# Patient Record
Sex: MALE | Race: ASIAN | Employment: FULL TIME | URBAN - METROPOLITAN AREA
[De-identification: names, ages, dates, MRNs, and addresses within clinical notes are randomized per-mention and may not be internally consistent; named-entity substitution may affect disease eponyms.]

---

## 2024-10-23 ENCOUNTER — HOSPITAL ENCOUNTER (EMERGENCY)
Age: 44
Discharge: HOME | End: 2024-10-24
Attending: EMERGENCY MEDICINE
Payer: COMMERCIAL

## 2024-10-23 ENCOUNTER — APPOINTMENT (OUTPATIENT)
Dept: GENERAL RADIOLOGY | Age: 44
End: 2024-10-23
Attending: EMERGENCY MEDICINE
Payer: COMMERCIAL

## 2024-10-23 DIAGNOSIS — R20.2 PARESTHESIAS: ICD-10-CM

## 2024-10-23 DIAGNOSIS — R00.2 PALPITATIONS: Primary | ICD-10-CM

## 2024-10-23 DIAGNOSIS — R07.89 CHEST PAIN, ATYPICAL: ICD-10-CM

## 2024-10-23 DIAGNOSIS — E87.6 HYPOKALEMIA: ICD-10-CM

## 2024-10-23 LAB
BASOPHILS # BLD AUTO: 0.06 X10(3) UL (ref 0–0.2)
BASOPHILS NFR BLD AUTO: 0.4 %
EOSINOPHIL # BLD AUTO: 0.09 X10(3) UL (ref 0–0.7)
EOSINOPHIL NFR BLD AUTO: 0.6 %
ERYTHROCYTE [DISTWIDTH] IN BLOOD BY AUTOMATED COUNT: 12.7 %
HCT VFR BLD AUTO: 39.3 %
HGB BLD-MCNC: 14 G/DL
IMM GRANULOCYTES # BLD AUTO: 0.08 X10(3) UL (ref 0–1)
IMM GRANULOCYTES NFR BLD: 0.5 %
LYMPHOCYTES # BLD AUTO: 1.67 X10(3) UL (ref 1–4)
LYMPHOCYTES NFR BLD AUTO: 10.5 %
MCH RBC QN AUTO: 31.8 PG (ref 26–34)
MCHC RBC AUTO-ENTMCNC: 35.6 G/DL (ref 31–37)
MCV RBC AUTO: 89.3 FL
MONOCYTES # BLD AUTO: 0.66 X10(3) UL (ref 0.1–1)
MONOCYTES NFR BLD AUTO: 4.1 %
NEUTROPHILS # BLD AUTO: 13.4 X10 (3) UL (ref 1.5–7.7)
NEUTROPHILS # BLD AUTO: 13.4 X10(3) UL (ref 1.5–7.7)
NEUTROPHILS NFR BLD AUTO: 83.9 %
PLATELET # BLD AUTO: 259 10(3)UL (ref 150–450)
RBC # BLD AUTO: 4.4 X10(6)UL
WBC # BLD AUTO: 16 X10(3) UL (ref 4–11)

## 2024-10-23 PROCEDURE — 85379 FIBRIN DEGRADATION QUANT: CPT | Performed by: EMERGENCY MEDICINE

## 2024-10-23 PROCEDURE — 99284 EMERGENCY DEPT VISIT MOD MDM: CPT

## 2024-10-23 PROCEDURE — 99285 EMERGENCY DEPT VISIT HI MDM: CPT

## 2024-10-23 PROCEDURE — 71045 X-RAY EXAM CHEST 1 VIEW: CPT | Performed by: EMERGENCY MEDICINE

## 2024-10-23 PROCEDURE — 93005 ELECTROCARDIOGRAM TRACING: CPT

## 2024-10-23 PROCEDURE — 93010 ELECTROCARDIOGRAM REPORT: CPT

## 2024-10-23 PROCEDURE — 84484 ASSAY OF TROPONIN QUANT: CPT | Performed by: EMERGENCY MEDICINE

## 2024-10-23 PROCEDURE — 36415 COLL VENOUS BLD VENIPUNCTURE: CPT

## 2024-10-23 PROCEDURE — 80053 COMPREHEN METABOLIC PANEL: CPT | Performed by: EMERGENCY MEDICINE

## 2024-10-23 PROCEDURE — 85025 COMPLETE CBC W/AUTO DIFF WBC: CPT | Performed by: EMERGENCY MEDICINE

## 2024-10-24 ENCOUNTER — APPOINTMENT (OUTPATIENT)
Dept: CT IMAGING | Age: 44
End: 2024-10-24
Attending: EMERGENCY MEDICINE
Payer: COMMERCIAL

## 2024-10-24 VITALS
WEIGHT: 198 LBS | HEART RATE: 66 BPM | DIASTOLIC BLOOD PRESSURE: 68 MMHG | HEIGHT: 72 IN | OXYGEN SATURATION: 99 % | BODY MASS INDEX: 26.82 KG/M2 | SYSTOLIC BLOOD PRESSURE: 103 MMHG | RESPIRATION RATE: 17 BRPM | TEMPERATURE: 98 F

## 2024-10-24 LAB
ALBUMIN SERPL-MCNC: 3.8 G/DL (ref 3.4–5)
ALBUMIN/GLOB SERPL: 1.2 {RATIO} (ref 1–2)
ALP LIVER SERPL-CCNC: 78 U/L
ALT SERPL-CCNC: 31 U/L
ANION GAP SERPL CALC-SCNC: 7 MMOL/L (ref 0–18)
AST SERPL-CCNC: 19 U/L (ref 15–37)
ATRIAL RATE: 92 BPM
BILIRUB SERPL-MCNC: 0.4 MG/DL (ref 0.1–2)
BUN BLD-MCNC: 20 MG/DL (ref 9–23)
CALCIUM BLD-MCNC: 9.3 MG/DL (ref 8.5–10.1)
CHLORIDE SERPL-SCNC: 104 MMOL/L (ref 98–112)
CO2 SERPL-SCNC: 25 MMOL/L (ref 21–32)
CREAT BLD-MCNC: 1.35 MG/DL
D DIMER PPP FEU-MCNC: <0.27 UG/ML FEU (ref ?–0.5)
EGFRCR SERPLBLD CKD-EPI 2021: 68 ML/MIN/1.73M2 (ref 60–?)
GLOBULIN PLAS-MCNC: 3.3 G/DL (ref 2.8–4.4)
GLUCOSE BLD-MCNC: 147 MG/DL (ref 70–99)
OSMOLALITY SERPL CALC.SUM OF ELEC: 287 MOSM/KG (ref 275–295)
P AXIS: 62 DEGREES
P-R INTERVAL: 170 MS
POTASSIUM SERPL-SCNC: 3.1 MMOL/L (ref 3.5–5.1)
POTASSIUM SERPL-SCNC: 3.8 MMOL/L (ref 3.5–5.1)
PROT SERPL-MCNC: 7.1 G/DL (ref 6.4–8.2)
Q-T INTERVAL: 374 MS
QRS DURATION: 92 MS
QTC CALCULATION (BEZET): 462 MS
R AXIS: 63 DEGREES
SODIUM SERPL-SCNC: 136 MMOL/L (ref 136–145)
T AXIS: 50 DEGREES
TROPONIN I SERPL HS-MCNC: 11 NG/L
VENTRICULAR RATE: 92 BPM

## 2024-10-24 PROCEDURE — 84132 ASSAY OF SERUM POTASSIUM: CPT | Performed by: EMERGENCY MEDICINE

## 2024-10-24 PROCEDURE — 70450 CT HEAD/BRAIN W/O DYE: CPT | Performed by: EMERGENCY MEDICINE

## 2024-10-24 RX ORDER — POTASSIUM CHLORIDE 1500 MG/1
20 TABLET, EXTENDED RELEASE ORAL DAILY
Qty: 14 TABLET | Refills: 0 | Status: SHIPPED | OUTPATIENT
Start: 2024-10-24 | End: 2024-11-07

## 2024-10-24 RX ORDER — POTASSIUM CHLORIDE 1500 MG/1
40 TABLET, EXTENDED RELEASE ORAL ONCE
Status: COMPLETED | OUTPATIENT
Start: 2024-10-24 | End: 2024-10-24

## 2024-10-24 NOTE — ED PROVIDER NOTES
Patient Seen in: [facility] [place]      History     Chief Complaint   Patient presents with    Arrythmia/Palpitations     Stated Complaint: palpatations    Subjective:   HPI      This is a 43-year-old gentleman here for evaluation of palpitations, left side of the body tingling/numbness.  Patient states he has had intermittent tingling and numbness on the left side of his body for many many years.  He states this evening he went to exercise, took his typical preworkout supplement which contains about 300 mg of caffeine.  Exercise doing a mix of weightlifting and cardio without difficulty.  Came home ate his dinner.  States later on that evening was sitting on the couch watching TV.  Suddenly felt strange, like his heart was racing, and felt left-sided tingling and numbness as he had before however symptoms were different today as the symptoms persisted for about an hour.  He also states with this left-sided tingling sensation that he occasionally gets a pinching sensation in his left side of the chest, also ongoing for many years.  Symptoms typically occur at rest when he gets this pinching sensation.  Denies any other complaints or concerns checked his heart rate on his Apple Watch and it was about 115.  Denies any chest pain any recent limitation to his exercise tolerance any family history of cardiac disease any leg swelling, fevers cough cold symptoms vomiting diarrhea any other concerns.  Objective:     Past Medical History:    Anxiety    Hyperlipidemia              No pertinent past surgical history.              Social History     Socioeconomic History    Marital status:    Tobacco Use    Smoking status: Never    Smokeless tobacco: Never   Vaping Use    Vaping status: Never Used   Substance and Sexual Activity    Alcohol use: Never    Drug use: Never     Social Drivers of Health     Financial Resource Strain: Low Risk  (8/2/2022)    Received from [facility]     Overall Financial Resource Strain (CARDIA)     Difficulty of Paying Living Expenses: Not hard at all   Food Insecurity: No Food Insecurity (8/2/2022)    Received from [facility]    Hunger Vital Sign     Worried About Running Out of Food in the Last Year: Never true     Ran Out of Food in the Last Year: Never true   Transportation Needs: No Transportation Needs (8/2/2022)    Received from [facility]    PRAPARE - Transportation     Lack of Transportation (Medical): No     Lack of Transportation (Non-Medical): No   Physical Activity: Sufficiently Active (8/2/2022)    Received from [facility]    Exercise Vital Sign     Days of Exercise per Week: 4 days     Minutes of Exercise per Session: 60 min   Stress: No Stress Concern Present (8/2/2022)    Received from [facility]    [employer] [place] [employer] - Occupational Stress Questionnaire     Feeling of Stress : Not at all   Social Connections: Moderately Integrated (8/2/2022)    Received from [facility]    Social Connection and Isolation Panel [NHANES]     Frequency of Communication with Friends and Family: More than three times a week     Frequency of Social Gatherings with Friends and Family: Twice a week     Attends Yazidi Services: More than 4 times per year     Active Member of Clubs or Organizations: No     Attends Club or Organization Meetings: Never     Marital Status:    Housing Stability: Low Risk  (8/2/2022)    Received from [facility]    Housing Stability Vital Sign     Unable to Pay for Housing in the Last Year: No     Number of Places Lived in the Last Year: 1     Unstable Housing in the Last Year: No                  Physical Exam     ED Triage Vitals [10/23/24 2309]   /71   Pulse 84   Resp 22   Temp 98.3 °F (36.8 °C)   Temp src Oral   SpO2 98 %   O2 Device None (Room air)       Current Vitals:   Vital Signs  BP: 103/68  Pulse: 66  Resp: 17  Temp:  98.3 °F (36.8 °C)  Temp src: Oral    Oxygen Therapy  SpO2: 99 %  O2 Device: None (Room air)        Physical Exam      Physical Exam  Vitals signs and nursing note reviewed.   General:  Patient laying supine in the bed in no acute distress  Head: Normocephalic and atraumatic.   HEENT:  Mucous membranes are moist.   Cardiovascular:  Normal rate and regular rhythm.  No Edema  Pulmonary:  Pulmonary effort is normal.  Normal breath sounds. No wheezing, rhonchi or rales.   Abdominal: Soft nontender nondistended, normal bowel sounds, no guarding no rebound tenderness  Skin: Warm and dry  Neurological: Awake alert, speech is normal, motor 5/5 in bilateral upper and lower extremities.  sensation is grossly intact throughout.  No facial asymmetry.  Stable narrow based gait.        ED Course     Labs Reviewed   COMP METABOLIC PANEL (14) - Abnormal; Notable for the following components:       Result Value    Glucose 147 (*)     Potassium 3.1 (*)     Creatinine 1.35 (*)     All other components within normal limits   CBC WITH DIFFERENTIAL WITH PLATELET - Abnormal; Notable for the following components:    WBC 16.0 (*)     Neutrophil Absolute Prelim 13.40 (*)     Neutrophil Absolute 13.40 (*)     All other components within normal limits   TROPONIN I HIGH SENSITIVITY - Normal   D-DIMER - Normal   POTASSIUM - Normal     EKG    Rate, intervals and axes as noted on EKG Report.  Rate: 92  Rhythm: Sinus Rhythm  Reading: No acute ischemic changes                XR CHEST AP PORTABLE  (CPT=71045)    Result Date: 10/23/2024            PROCEDURE:  XR CHEST AP PORTABLE  (CPT=71045)  TECHNIQUE:  AP chest radiograph was obtained.  COMPARISON:  None.  INDICATIONS:  palpatations  PATIENT STATED HISTORY: (As transcribed by Technologist)  While watching TV, patient had an episode of heart palpatations and numbness in left arm.    FINDINGS: Cardiac silhouette and pulmonary vasculature are unremarkable. No consolidation, pleural effusion or  pneumothorax. IMPRESSION: Unremarkable portable chest radiograph.   LOCATION:  [facility]      Dictated by (CST): Pawan Edwards MD on 10/23/2024 at 11:19 PM     Finalized by (CST): Pawan Edwards MD on 10/23/2024 at 11:20 PM           MDM      This is a 43-year-old gentleman here for evaluation of palpitations, left side of the body numbness/tingling, intermittent pinching sensation in the chest..  Differential includes paresthesias, acute CVA, cardiac dysrhythmia, ACS pulmonary embolism.  EKG normal sinus rhythm, rate is controlled troponin D-dimer negative, initial electrolytes showed potassium of 3.1 however patient was reportedly breathing quickly according to nursing staff.  Repeat potassium was 3.8.  CT of the head was performed shows no acute abnormality.  Patient was monitored here on the cardiac monitor and there are no signs of any cardiac dysrhythmia.  Spoke with patient about the unclear etiology of his symptoms.  He reports a very good exercise tolerance with no recent change symptoms sound less likely cardiac in nature.  I do believe he should stop the preworkout supplement which contains up to 300 mg of caffeine as this is quite excessive.  I have recommend he follow-up with neurology given the recurrent paresthesias on the left side states ongoing for many years.  He will follow-up with cardiology as well given the reported palpitations to consider Holter monitor.  Return precautions were discussed patient is in agreement with plan.        Medical Decision Making      Disposition and Plan     Clinical Impression:  1. Palpitations    2. Paresthesias    3. Hypokalemia    4. Chest pain, atypical         Disposition:  Discharge  10/24/2024  1:30 am    Follow-up:  Zoie Singh MD  [address] [facility] [address]  [phone]    Follow up  Follow-up with your primary doctor or at the clinic listed for further evaluation.  Return to the emergency department immediately if your symptoms  do not continue to improve or if you have any new concerning symptoms.    [facility], [place] [address] [place] [id number] [phone]  Schedule an appointment as soon as possible for a visit in 1 day(s)      [address] [address] [phone]  Schedule an appointment as soon as possible for a visit in 1 day(s)            Medications Prescribed:  Discharge Medication List as of 10/24/2024  2:47 AM        START taking these medications    Details   potassium chloride 20 MEQ Oral Tab CR Take 1 tablet (20 mEq total) by mouth daily for 14 days., Normal, Disp-14 tablet, R-0                 Supplementary Documentation:

## 2024-10-28 ENCOUNTER — OFFICE VISIT (OUTPATIENT)
Dept: INTERNAL MEDICINE CLINIC | Facility: CLINIC | Age: 44
End: 2024-10-28
Payer: COMMERCIAL

## 2024-10-28 VITALS
OXYGEN SATURATION: 99 % | SYSTOLIC BLOOD PRESSURE: 86 MMHG | DIASTOLIC BLOOD PRESSURE: 56 MMHG | HEART RATE: 92 BPM | HEIGHT: 72 IN | TEMPERATURE: 97 F | WEIGHT: 190 LBS | BODY MASS INDEX: 25.73 KG/M2 | RESPIRATION RATE: 14 BRPM

## 2024-10-28 DIAGNOSIS — R79.89 LOW TESTOSTERONE: ICD-10-CM

## 2024-10-28 DIAGNOSIS — R00.2 PALPITATIONS: ICD-10-CM

## 2024-10-28 DIAGNOSIS — Z13.220 SCREENING FOR LIPID DISORDERS: ICD-10-CM

## 2024-10-28 DIAGNOSIS — E87.6 HYPOKALEMIA: ICD-10-CM

## 2024-10-28 DIAGNOSIS — Z13.228 SCREENING FOR METABOLIC DISORDER: ICD-10-CM

## 2024-10-28 DIAGNOSIS — Z13.0 SCREENING FOR DISORDER OF BLOOD AND BLOOD-FORMING ORGANS: ICD-10-CM

## 2024-10-28 DIAGNOSIS — Z13.29 SCREENING FOR THYROID DISORDER: ICD-10-CM

## 2024-10-28 DIAGNOSIS — R20.2 ARM PARESTHESIA, LEFT: Primary | ICD-10-CM

## 2024-10-28 DIAGNOSIS — E55.9 VITAMIN D INSUFFICIENCY: ICD-10-CM

## 2024-10-28 PROCEDURE — 99204 OFFICE O/P NEW MOD 45 MIN: CPT

## 2024-10-28 RX ORDER — TIRZEPATIDE 10 MG/.5ML
INJECTION, SOLUTION SUBCUTANEOUS
COMMUNITY

## 2024-10-28 RX ORDER — MONTELUKAST SODIUM 10 MG/1
10 TABLET ORAL NIGHTLY
COMMUNITY

## 2024-10-28 RX ORDER — TRETINOIN 1 MG/G
1 GEL TOPICAL NIGHTLY
COMMUNITY
Start: 2024-07-26 | End: 2025-07-26

## 2024-10-28 RX ORDER — SERTRALINE HYDROCHLORIDE 100 MG/1
100 TABLET, FILM COATED ORAL DAILY
COMMUNITY
Start: 2024-07-26

## 2024-10-28 RX ORDER — ATORVASTATIN CALCIUM 20 MG/1
20 TABLET, FILM COATED ORAL NIGHTLY
COMMUNITY
Start: 2024-07-26

## 2024-10-28 NOTE — PROGRESS NOTES
Brent Huertas is a 43 year old male.   Chief Complaint   Patient presents with    New Patient     10/23/24 ED follow-up for chest pain, heart palpitations, and paresthesia. Labs, imaging, and EKG done during ED admission.     HPI:    Patient here today for ER follow up and to establish care. Patient was recently seen at [place] ER for palpitations and intermittent numbness/tingling of left side of body. He reports numbness/tingling has occurred intermittently for years and is not new but palpitations was a new symptom which concerned him in addition to numbness/tingling lasting longer than his previous episodes. Today he denies symptoms. No chest pain, breathing changes, weakness. He denies recent illness. Patient reports good exercise tolerance which is an important part of his lifestyle. He consumes 1 cup of coffee in addition to 300 mg caffeine pre work out daily taken for years now. Laboratory evaluation in ER revealed mild hypokalemia which had been replaced. CT scan of head with no acute findings. EKG revealed NSR with low voltage QRS with no prior for comparison. PMHX of anxiety stable on sertraline, hypercholesteremia taking atorvastatin 20 mg daily. He does have upcoming annual with Dr. Blake scheduled but here today to follow up from ER encounter.     Allergies:  Allergies[1]   Current Meds:  Current Outpatient Medications   Medication Sig Dispense Refill    atorvastatin 20 MG Oral Tab Take 1 tablet (20 mg total) by mouth nightly.      montelukast 10 MG Oral Tab Take 1 tablet (10 mg total) by mouth nightly.      sertraline 100 MG Oral Tab Take 1 tablet (100 mg total) by mouth daily.      ZEPBOUND 10 MG/0.5ML Subcutaneous Solution Auto-injector APPLY 10 MG UNDER THE SKIN ONCE WEEKLY      Tretinoin Microsphere 0.1 % External Gel Apply 1 Application topically nightly. (Patient not taking: Reported on 10/28/2024)      potassium chloride 20 MEQ Oral Tab CR Take 1 tablet (20 mEq total) by mouth daily for 14  days. (Patient not taking: Reported on 10/28/2024) 14 tablet 0        PMH:     Past Medical History:    Anxiety    Hyperlipidemia       ROS:   Review of Systems   Constitutional: Negative.    HENT: Negative.     Respiratory: Negative.     Cardiovascular:  Positive for palpitations (not present, last felt when seen in ER).   Gastrointestinal: Negative.    Genitourinary: Negative.    Musculoskeletal: Negative.    Skin: Negative.    Neurological: Negative.       PHYSICAL EXAM:    BP (!) 86/56   Pulse 92   Temp 97.1 °F (36.2 °C) (Temporal)   Resp 14   Ht 6' (1.829 m)   Wt 190 lb (86.2 kg)   SpO2 99%   BMI 25.77 kg/m²   Physical Exam  Constitutional:       General: He is not in acute distress.     Appearance: Normal appearance. He is normal weight. He is not ill-appearing, toxic-appearing or diaphoretic.   Cardiovascular:      Rate and Rhythm: Normal rate.      Pulses: Normal pulses.   Pulmonary:      Effort: Pulmonary effort is normal.      Breath sounds: Normal breath sounds.   Skin:     General: Skin is warm and dry.      Capillary Refill: Capillary refill takes less than 2 seconds.   Neurological:      Mental Status: He is alert. Mental status is at baseline.        ASSESSMENT/ PLAN:   1. Arm paresthesia, left  Check labs. Recommend to monitor BP if able and send us readings to compare. Hypotension noted on vitals today which he reports his blood pressure systolic historically around 100. He is asymptomatic at this time. Encouraged hydration and to monitor.   - CARD ECHO STRESS ECHO/REST AND STRESS(CPT=93350/32464 DMG 97364); Future  - Vitamin B12 [E]    2. Palpitations  Have not returned in last 5 days, recommend labs and stress echo pending findings discussed cardiology consult verses holter. Holter not ordered due to palpitations infrequent. Recommend to decrease caffeine or hold pre-workout supplement.   - CARD ECHO STRESS ECHO/REST AND STRESS(CPT=93350/37279 DMG 81865); Future  [facility] screening discussed as  well  3. Hypokalemia  Check gven recent hypokalemia   - Magnesium [E]    4. Vitamin D insufficiency  - Vitamin D [E]; Future    5. Screening for disorder of blood and blood-forming organs  - CBC With Differential With Platelet    6. Screening for lipid disorders  - Lipid Panel    7. Screening for thyroid disorder  - TSH W Reflex To Free T4    8. Screening for metabolic disorder  - Comp Metabolic Panel (14) [E]    9. Low testosterone  Previously followed with urology for his low testosterone- requested level check.   - TESTOSTERONE,FREE AND TOTAL [24198][Q]      Health Maintenance Due   Topic Date Due    Annual Physical  Never done    DTaP,Tdap,and Td Vaccines (1 - Tdap) Never done    Annual Depression Screening  Never done    COVID-19 Vaccine (1 - 2023-24 season) Never done    Influenza Vaccine (1) 10/01/2024     ER warnings discussed  Pt indicates understanding and agrees to the plan.   Follow up as scheduled    YURI Krueger          [1] No Known Allergies

## 2024-11-10 PROBLEM — F41.9 ANXIETY: Status: ACTIVE | Noted: 2021-02-11

## 2024-11-10 PROBLEM — E78.00 HYPERCHOLESTEREMIA: Status: ACTIVE | Noted: 2019-12-23

## 2024-11-12 ENCOUNTER — OFFICE VISIT (OUTPATIENT)
Dept: INTERNAL MEDICINE CLINIC | Facility: CLINIC | Age: 44
End: 2024-11-12
Payer: COMMERCIAL

## 2024-11-12 VITALS
WEIGHT: 189 LBS | SYSTOLIC BLOOD PRESSURE: 104 MMHG | TEMPERATURE: 98 F | HEART RATE: 78 BPM | OXYGEN SATURATION: 98 % | RESPIRATION RATE: 14 BRPM | DIASTOLIC BLOOD PRESSURE: 64 MMHG | HEIGHT: 71 IN | BODY MASS INDEX: 26.46 KG/M2

## 2024-11-12 DIAGNOSIS — G25.81 RESTLESS LEG SYNDROME: ICD-10-CM

## 2024-11-12 DIAGNOSIS — G89.29 CHRONIC PAIN OF RIGHT KNEE: ICD-10-CM

## 2024-11-12 DIAGNOSIS — R20.2 ARM PARESTHESIA, LEFT: ICD-10-CM

## 2024-11-12 DIAGNOSIS — Z00.00 WELLNESS EXAMINATION: Primary | ICD-10-CM

## 2024-11-12 DIAGNOSIS — R07.89 ATYPICAL CHEST PAIN: ICD-10-CM

## 2024-11-12 DIAGNOSIS — F41.9 ANXIETY: ICD-10-CM

## 2024-11-12 DIAGNOSIS — M25.561 CHRONIC PAIN OF RIGHT KNEE: ICD-10-CM

## 2024-11-12 DIAGNOSIS — E78.00 HYPERCHOLESTEREMIA: ICD-10-CM

## 2024-11-12 PROCEDURE — 99396 PREV VISIT EST AGE 40-64: CPT | Performed by: FAMILY MEDICINE

## 2024-11-12 RX ORDER — HYDROXYZINE HYDROCHLORIDE 25 MG/1
25 TABLET, FILM COATED ORAL 2 TIMES DAILY PRN
COMMUNITY
Start: 2024-11-06

## 2024-11-12 NOTE — PROGRESS NOTES
Brent Huertas  11/14/1980    Chief Complaint   Patient presents with    Physical     Discuss hx of restless legs, mainly at night        HPI:   Brent Huertas is a 43 year old male who presents for CPE. He had a recent ED visit for some paresthesias through the left side of the arm and atypical chest discomfort. He head CT that was normal and normal trop and dimer. Completed follow-up labs yesterday and will schedule his stress test. Has also been having some RLS symptoms. He was undergoing evaluation for SUSANNA procedure of his R knee. Had previous HA and CSI injections.     Current Outpatient Medications   Medication Sig Dispense Refill    hydrOXYzine 25 MG Oral Tab Take 1 tablet (25 mg total) by mouth 2 (two) times daily as needed.      atorvastatin 20 MG Oral Tab Take 1 tablet (20 mg total) by mouth nightly.      montelukast 10 MG Oral Tab Take 1 tablet (10 mg total) by mouth nightly.      sertraline 100 MG Oral Tab Take 1 tablet (100 mg total) by mouth daily.      ZEPBOUND 10 MG/0.5ML Subcutaneous Solution Auto-injector APPLY 10 MG UNDER THE SKIN ONCE WEEKLY        Allergies[1]   Past Medical History:    Anxiety    Hyperlipidemia      Patient Active Problem List   Diagnosis    Hypercholesteremia    Anxiety      Past Surgical History:   Procedure Laterality Date    Other surgical history Bilateral     knee surg      Family History   Problem Relation Age of Onset    Lymphoma in remission Mother     Stroke Father     No Known Problems Sister     No Known Problems Brother       Social History     Socioeconomic History    Marital status:    Tobacco Use    Smoking status: Never    Smokeless tobacco: Never   Vaping Use    Vaping status: Never Used   Substance and Sexual Activity    Alcohol use: Never    Drug use: Never     Social Drivers of Health     Financial Resource Strain: Low Risk  (8/2/2022)    Received from [facility]    Overall Financial Resource Strain (CARDIA)     Difficulty of Paying  Living Expenses: Not hard at all   Food Insecurity: No Food Insecurity (8/2/2022)    Received from [facility]    Hunger Vital Sign     Worried About Running Out of Food in the Last Year: Never true     Ran Out of Food in the Last Year: Never true   Transportation Needs: No Transportation Needs (8/2/2022)    Received from [facility]    PRAPARE - Transportation     Lack of Transportation (Medical): No     Lack of Transportation (Non-Medical): No   Physical Activity: Sufficiently Active (8/2/2022)    Received from [facility]    Exercise Vital Sign     Days of Exercise per Week: 4 days     Minutes of Exercise per Session: 60 min   Stress: No Stress Concern Present (8/2/2022)    Received from [facility]    [employer] [place] [employer] - Occupational Stress Questionnaire     Feeling of Stress : Not at all   Social Connections: Moderately Integrated (8/2/2022)    Received from [facility]    Social Connection and Isolation Panel [NHANES]     Frequency of Communication with Friends and Family: More than three times a week     Frequency of Social Gatherings with Friends and Family: Twice a week     Attends Alevism Services: More than 4 times per year     Active Member of Clubs or Organizations: No     Attends Club or Organization Meetings: Never     Marital Status:    Housing Stability: Low Risk  (8/2/2022)    Received from [facility]    Housing Stability Vital Sign     Unable to Pay for Housing in the Last Year: No     Number of Places Lived in the Last Year: 1     Unstable Housing in the Last Year: No         REVIEW OF SYSTEMS:   GENERAL: on recent illness. See HPI.     EXAM:   /64   Pulse 78   Temp 97.8 °F (36.6 °C) (Temporal)   Resp 14   Ht 5' 11\" (1.803 m)   Wt 189 lb (85.7 kg)   SpO2 98%   BMI 26.36 kg/m²   GENERAL: Well developed, well nourished,in no apparent distress  SKIN: No rash  EYES: PERRLA,  EOMI, conjunctiva are clear  HEENT: atraumatic, normocephalic,ears and throat are clear  NECK: supple,no adenopathy,no bruits  LUNGS: clear to auscultation  CARDIO: RRR without murmur  GI: good BS's,no masses, HSM or tenderness    ASSESSMENT AND PLAN:   Brent Huertas is a 43 year old male who presents for CPE    Wellness examination    Atypical chest pain  Arm paresthesia, left  Restless leg syndrome  Has neuropathy labs pending and will schedule his stress test. Reviewed ED work-up. Further recommendations pending his results.     Hypercholesteremia  Continue statin, lipid panel pending.     Anxiety  Will continue sertraline and hydroxyzine for now.     Chronic pain of right knee  Recommend evaluation with Dr. Vargas  - Ortho Referral - [facility]    All questions were answered and the patient agrees with the plan.     Thank you,  Watson Blake MD         [1] No Known Allergies

## 2024-11-16 LAB
ABSOLUTE BASOPHILS: 62 CELLS/UL (ref 0–200)
ABSOLUTE EOSINOPHILS: 262 CELLS/UL (ref 15–500)
ABSOLUTE LYMPHOCYTES: 1849 CELLS/UL (ref 850–3900)
ABSOLUTE MONOCYTES: 518 CELLS/UL (ref 200–950)
ABSOLUTE NEUTROPHILS: 4209 CELLS/UL (ref 1500–7800)
ALBUMIN/GLOBULIN RATIO: 1.5 (CALC) (ref 1–2.5)
ALBUMIN: 4.5 G/DL (ref 3.6–5.1)
ALKALINE PHOSPHATASE: 69 U/L (ref 36–130)
ALT: 15 U/L (ref 9–46)
AST: 14 U/L (ref 10–40)
BASOPHILS: 0.9 %
BILIRUBIN, TOTAL: 0.6 MG/DL (ref 0.2–1.2)
BUN: 22 MG/DL (ref 7–25)
CALCIUM: 9.9 MG/DL (ref 8.6–10.3)
CARBON DIOXIDE: 25 MMOL/L (ref 20–32)
CHLORIDE: 105 MMOL/L (ref 98–110)
CHOL/HDLC RATIO: 3.5 (CALC)
CHOLESTEROL, TOTAL: 149 MG/DL
CREATININE: 1.17 MG/DL (ref 0.6–1.29)
EGFR: 80 ML/MIN/1.73M2
EOSINOPHILS: 3.8 %
FREE TESTOSTERONE: 36.1 PG/ML (ref 35–155)
GLOBULIN: 3 G/DL (CALC) (ref 1.9–3.7)
GLUCOSE: 82 MG/DL (ref 65–99)
HDL CHOLESTEROL: 42 MG/DL
HEMATOCRIT: 47.9 % (ref 38.5–50)
HEMOGLOBIN: 15.5 G/DL (ref 13.2–17.1)
LDL-CHOLESTEROL: 87 MG/DL (CALC)
LYMPHOCYTES: 26.8 %
MAGNESIUM: 2.1 MG/DL (ref 1.5–2.5)
MCH: 30.3 PG (ref 27–33)
MCHC: 32.4 G/DL (ref 32–36)
MCV: 93.7 FL (ref 80–100)
MONOCYTES: 7.5 %
MPV: 9.6 FL (ref 7.5–12.5)
NEUTROPHILS: 61 %
NON-HDL CHOLESTEROL: 107 MG/DL (CALC)
PLATELET COUNT: 283 THOUSAND/UL (ref 140–400)
POTASSIUM: 5 MMOL/L (ref 3.5–5.3)
PROTEIN, TOTAL: 7.5 G/DL (ref 6.1–8.1)
RDW: 12.3 % (ref 11–15)
RED BLOOD CELL COUNT: 5.11 MILLION/UL (ref 4.2–5.8)
SODIUM: 141 MMOL/L (ref 135–146)
TESTOSTERONE, TOTAL,$/MS/MS: 233 NG/DL (ref 250–1100)
TRIGLYCERIDES: 100 MG/DL
TSH W/REFLEX TO FT4: 1.24 MIU/L (ref 0.4–4.5)
VITAMIN B12: 620 PG/ML (ref 200–1100)
WHITE BLOOD CELL COUNT: 6.9 THOUSAND/UL (ref 3.8–10.8)

## 2024-11-21 ENCOUNTER — ORDER TRANSCRIPTION (OUTPATIENT)
Dept: ADMINISTRATIVE | Facility: HOSPITAL | Age: 44
End: 2024-11-21

## 2024-11-21 DIAGNOSIS — Z13.6 SCREENING FOR CARDIOVASCULAR CONDITION: Primary | ICD-10-CM

## 2025-01-09 NOTE — PROGRESS NOTES
Urology Clinic Note - New Patient    Referring Provider:  No referring provider defined for this encounter.     Primary Care Provider:  Watson Blake MD     Chief Complaint:   Hypogonadism    HPI:     Brent Huertas is a 44 year old male with history of HLD, amnxiety referred for low testosterone.    Patient reports for 6 or 7-year history of low energy, weight gain.  He saw urologist in [place].  At 1 point he was on testosterone injection therapy and later changed to oral estrogen.  He was also trialed on Clomid as he wanted to preserve fertility, patient has 3 children but was unsure if he wanted more at that time.  He states that these treatments were all for short amount of time and he cannot recall if his symptoms improved or not.  He has not been on any therapy for about 4 to 5 years.  Most recently- T 11/11/24- 233  Previous was 320 in 2023  He is now here for evaluation.  He has ongoing issues with trouble losing weight despite exercise, energy levels, mood, slightly low libido and occasional erectile dysfunction.  He has no other urologic history.  No marijuana or smoking.  No alcohol.  Works in IT.        PSA:  No results found for: \"PSA\", \"PERCENTPSA\", \"PSAS\", \"PSAULTRA\", \"QPSA\", \"PSATOT\", \"TOTPSADX\", \"TOTPSASCREEN\"   Last Cr was 1.17 done on 11/11/2024.      History:     Past Medical History:    Anxiety    Hyperlipidemia       Past Surgical History:   Procedure Laterality Date    Other surgical history Bilateral     knee surg       Family History   Problem Relation Age of Onset    Lymphoma in remission Mother     Stroke Father     No Known Problems Sister     No Known Problems Brother        Social History     Socioeconomic History    Marital status:    Tobacco Use    Smoking status: Never    Smokeless tobacco: Never   Vaping Use    Vaping status: Never Used   Substance and Sexual Activity    Alcohol use: Never    Drug use: Never     Social Drivers of Health     Financial Resource Strain: Low  Risk  (8/2/2022)    Received from [facility]    Overall Financial Resource Strain (CARDIA)     Difficulty of Paying Living Expenses: Not hard at all   Food Insecurity: No Food Insecurity (8/2/2022)    Received from [facility]    Hunger Vital Sign     Worried About Running Out of Food in the Last Year: Never true     Ran Out of Food in the Last Year: Never true   Transportation Needs: No Transportation Needs (8/2/2022)    Received from [facility]    PRAPARE - Transportation     Lack of Transportation (Medical): No     Lack of Transportation (Non-Medical): No   Physical Activity: Sufficiently Active (8/2/2022)    Received from [facility]    Exercise Vital Sign     Days of Exercise per Week: 4 days     Minutes of Exercise per Session: 60 min   Stress: No Stress Concern Present (8/2/2022)    Received from [facility]    [employer] [place] [employer] - Occupational Stress Questionnaire     Feeling of Stress : Not at all   Social Connections: Moderately Integrated (8/2/2022)    Received from [facility]    Social Connection and Isolation Panel [NHANES]     Frequency of Communication with Friends and Family: More than three times a week     Frequency of Social Gatherings with Friends and Family: Twice a week     Attends Druze Services: More than 4 times per year     Active Member of Clubs or Organizations: No     Attends Club or Organization Meetings: Never     Marital Status:    Housing Stability: Low Risk  (8/2/2022)    Received from [facility]    Housing Stability Vital Sign     Unable to Pay for Housing in the Last Year: No     Number of Places Lived in the Last Year: 1     Unstable Housing in the Last Year: No       Medications (Active prior to today's visit):  Current Outpatient Medications   Medication Sig Dispense Refill    hydrOXYzine 25 MG Oral Tab Take 1 tablet (25 mg total) by mouth 2 (two)  times daily as needed.      atorvastatin 20 MG Oral Tab Take 1 tablet (20 mg total) by mouth nightly.      montelukast 10 MG Oral Tab Take 1 tablet (10 mg total) by mouth nightly.      sertraline 100 MG Oral Tab Take 1 tablet (100 mg total) by mouth daily.         Allergies:  Allergies[1]      Review of Systems:   A comprehensive 10-point review of systems was completed.  Pertinent positives and negatives are noted in the the HPI.    Physical Exam:   CONSTITUTIONAL: Well developed, well nourished, in no acute distress  NEUROLOGIC: Alert and oriented  HEAD: Normocephalic, atraumatic  ENT: Hearing intact   RESPIRATORY: Normal respiratory effort  SKIN: No evident rashes  ABDOMEN: Soft, non-tender, non-distended     No results found.      Assessment & Plan:     I spoke with him in detail regarding androgen replacement therapy with supplemental testosterone.  I discussed the various methods of delivery including AndroGel, Testopel, testosterone injections.  I discussed the risk of polycythemia and need for monitoring of blood work.  I discussed that at this time there is not definitive evidence whether testosterone increases or decreases the risk of cardiovascular events.  I discussed that research shows there is no increased risk of prostate cancer with ART.  Lastly, I informed him that testosterone replacement therapy can cause infertility all on the medication if he is attempting to have children.    - Labs: Testosterone, free testosterone, LH, estradiol, PSA, hemoglobin/hematocrit    Patient is unsure if he would want to proceed with treatment with testosterone therapy given concern for infertility.  He may consider Clomid off label.  We will discuss more at his next visit.    ED- discussed txt options, for now wants to hold off     Thank you for this consult.    I have personally reviewed all relevant medical records, labs, and imaging.       Chadd Lobato MD  Staff Urologist  [facility]  Office:  [phone]           [1] No Known Allergies

## 2025-01-15 ENCOUNTER — OFFICE VISIT (OUTPATIENT)
Dept: SURGERY | Facility: CLINIC | Age: 45
End: 2025-01-15

## 2025-01-15 DIAGNOSIS — E29.1 HYPOGONADISM IN MALE: ICD-10-CM

## 2025-01-15 DIAGNOSIS — R82.998 CALCIUM OXALATE CRYSTALS IN URINE: ICD-10-CM

## 2025-01-15 DIAGNOSIS — R82.90 URINE FINDING: Primary | ICD-10-CM

## 2025-01-15 LAB
APPEARANCE: CLEAR
BILIRUBIN: NEGATIVE
GLUCOSE (URINE DIPSTICK): NEGATIVE MG/DL
KETONES (URINE DIPSTICK): NEGATIVE MG/DL
LEUKOCYTES: NEGATIVE
MULTISTIX LOT#: ABNORMAL NUMERIC
NITRITE, URINE: NEGATIVE
PH, URINE: 6 (ref 4.5–8)
SPECIFIC GRAVITY: 1.02 (ref 1–1.03)
URINE-COLOR: YELLOW
UROBILINOGEN,SEMI-QN: 1 MG/DL (ref 0–1.9)

## 2025-01-15 PROCEDURE — 99203 OFFICE O/P NEW LOW 30 MIN: CPT | Performed by: UROLOGY

## 2025-01-15 PROCEDURE — 81003 URINALYSIS AUTO W/O SCOPE: CPT | Performed by: UROLOGY

## 2025-01-31 LAB
ESTRADIOL: 36 PG/ML
FREE TESTOSTERONE: 63.5 PG/ML (ref 35–155)
HEMATOCRIT: 45.1 % (ref 38.5–50)
HEMOGLOBIN: 14.8 G/DL (ref 13.2–17.1)
MCH: 30.5 PG (ref 27–33)
MCHC: 32.8 G/DL (ref 32–36)
MCV: 92.8 FL (ref 80–100)
MPV: 9.6 FL (ref 7.5–12.5)
PLATELET COUNT: 307 THOUSAND/UL (ref 140–400)
RDW: 13 % (ref 11–15)
RED BLOOD CELL COUNT: 4.86 MILLION/UL (ref 4.2–5.8)
TESTOSTERONE, TOTAL,$/MS/MS: 528 NG/DL (ref 250–1100)
WHITE BLOOD CELL COUNT: 7.1 THOUSAND/UL (ref 3.8–10.8)

## 2025-02-12 ENCOUNTER — HOSPITAL ENCOUNTER (OUTPATIENT)
Dept: ULTRASOUND IMAGING | Age: 45
Discharge: HOME | End: 2025-02-12
Attending: UROLOGY
Payer: COMMERCIAL

## 2025-02-12 DIAGNOSIS — R82.998 CALCIUM OXALATE CRYSTALS IN URINE: ICD-10-CM

## 2025-02-12 PROCEDURE — 76770 US EXAM ABDO BACK WALL COMP: CPT | Performed by: UROLOGY

## 2025-02-18 ENCOUNTER — TELEMEDICINE (OUTPATIENT)
Dept: SURGERY | Facility: CLINIC | Age: 45
End: 2025-02-18
Payer: COMMERCIAL

## 2025-02-18 DIAGNOSIS — E29.1 HYPOGONADISM IN MALE: Primary | ICD-10-CM

## 2025-02-18 PROCEDURE — 99213 OFFICE O/P EST LOW 20 MIN: CPT | Performed by: UROLOGY

## 2025-02-18 NOTE — PROGRESS NOTES
Urology Clinic Note  Telemedicine Visit  Audio & Video      Brent Huertas verbally consents to a Virtual Video Check-In service today.  This is a telemedicine visit with live, interactive video and audio.   Patient understands and accepts financial responsibility for any deductible, co-insurance and/or co-pays associated with this service.    Duration of the service including review of pertinent imaging/labs and coordination of care: 20 minutes    Summary of topics discussed:  See below    Primary Care Provider:  Watson Blake MD     Chief Complaint:   Concern for hypogonadism     HPI:      Brent Huertas is a 44 year old male with history of HLD, amnxiety referred for low testosterone.     Patient reports for 6 or 7-year history of low energy, weight gain.  He saw urologist in [place].  At some point he was on testosterone injection therapy and later changed to oral testosterone.  He was also trialed on Clomid as he wanted to preserve fertility, patient has 3 children but was unsure if he wanted more at that time.  He states that these treatments were all for short amount of time and he cannot recall if his symptoms improved or not.  He has not been on any therapy for about 4 to 5 years.  Most recently- T 11/11/24- 233  Previous was 320 in 2023    He then flori me for evaluation on 1/15. He has ongoing issues with trouble losing weight despite exercise, energy levels, mood, slightly low libido and occasional erectile dysfunction.  He has no other urologic history.  No marijuana or smoking.  No alcohol.  Works in IT.  At that time- we decided to recheck blood work.   T 1/25/25; 528.   Patient here via video call to discuss. He again reiterates that he is not currently on any kind of testosterone supplementation.  His symptoms are stable as above.  Of note, his PCP is worked him up for thyroid disorder.  Vitamin D testing is pending.  At last visit, patient also had some calcium oxalate crystals in his urine.  We  checked an ultrasound.  This is normal.    PSA:  No results found for: \"PSA\", \"PERCENTPSA\", \"PSAS\", \"PSAULTRA\"     History:     Past Medical History:    Anxiety    Hyperlipidemia       Past Surgical History:   Procedure Laterality Date    Other surgical history Bilateral     knee surg       Family History   Problem Relation Age of Onset    Lymphoma in remission Mother     Stroke Father     No Known Problems Sister     No Known Problems Brother        Social History     Socioeconomic History    Marital status:    Tobacco Use    Smoking status: Never    Smokeless tobacco: Never   Vaping Use    Vaping status: Never Used   Substance and Sexual Activity    Alcohol use: Never    Drug use: Never     Social Drivers of Health     Food Insecurity: No Food Insecurity (8/2/2022)    Received from [facility]    Hunger Vital Sign     Worried About Running Out of Food in the Last Year: Never true     Ran Out of Food in the Last Year: Never true   Transportation Needs: No Transportation Needs (8/2/2022)    Received from [facility]    PRAPARE - Transportation     Lack of Transportation (Medical): No     Lack of Transportation (Non-Medical): No   Stress: No Stress Concern Present (8/2/2022)    Received from [facility]    [employer] [place] [employer] - Occupational Stress Questionnaire     Feeling of Stress : Not at all   Housing Stability: Low Risk  (8/2/2022)    Received from [facility]    Housing Stability Vital Sign     Unable to Pay for Housing in the Last Year: No     Number of Places Lived in the Last Year: 1     Unstable Housing in the Last Year: No       Medications (Active prior to today's visit):  Current Outpatient Medications   Medication Sig Dispense Refill    hydrOXYzine 25 MG Oral Tab Take 1 tablet (25 mg total) by mouth 2 (two) times daily as needed.      atorvastatin 20 MG Oral Tab Take 1 tablet (20 mg total) by mouth nightly.      montelukast  10 MG Oral Tab Take 1 tablet (10 mg total) by mouth nightly.      sertraline 100 MG Oral Tab Take 1 tablet (100 mg total) by mouth daily.         Allergies:  Allergies[1]    Review of Systems:   A comprehensive 10-point review of systems was completed.  Pertinent positives and negatives are noted in the the HPI.    Physical Exam:   CONSTITUTIONAL: Well developed, well nourished, in no acute distress  NEUROLOGIC: Alert and oriented, normal speech  EYES: Sclera non-icteric  HEAD: Normocephalic, atraumatic  ENT: Hearing intact  RESPIRATORY: Normal respiratory effort    Assessment & Plan:       History of hypogonadism  Discussed above history in detail.  Patient previously on therapy, testosterone is now normal.  Does not meet criteria for TRT.  We discussed this extensively today.  He would like to recheck his labs in a few months to ensure they remain normal.  This order is in place.  He will then make an appointment on [facility] to discuss results.  All questions answered.      In total, 20 minutes were spent on this patient encounter (including chart review, patient history, physical, and counseling, documentation, and communication).             Chadd Lobato MD  Staff Urologist  [facility]  Office: [phone]             [1] No Known Allergies

## 2025-02-26 ENCOUNTER — OFFICE VISIT (OUTPATIENT)
Dept: INTERNAL MEDICINE CLINIC | Facility: CLINIC | Age: 45
End: 2025-02-26
Payer: COMMERCIAL

## 2025-02-26 VITALS
WEIGHT: 204.63 LBS | HEART RATE: 78 BPM | SYSTOLIC BLOOD PRESSURE: 120 MMHG | HEIGHT: 71 IN | BODY MASS INDEX: 28.65 KG/M2 | DIASTOLIC BLOOD PRESSURE: 74 MMHG | RESPIRATION RATE: 18 BRPM

## 2025-02-26 DIAGNOSIS — E55.9 VITAMIN D DEFICIENCY: ICD-10-CM

## 2025-02-26 DIAGNOSIS — K14.6 TONGUE SORE: Primary | ICD-10-CM

## 2025-02-26 PROCEDURE — 99214 OFFICE O/P EST MOD 30 MIN: CPT

## 2025-02-26 RX ORDER — TRIAMCINOLONE ACETONIDE 0.1 %
PASTE (GRAM) DENTAL
Qty: 5 G | Refills: 0 | Status: SHIPPED | OUTPATIENT
Start: 2025-02-26

## 2025-02-26 RX ORDER — LIDOCAINE HYDROCHLORIDE 20 MG/ML
SOLUTION OROPHARYNGEAL
COMMUNITY
Start: 2025-02-01

## 2025-02-26 NOTE — PROGRESS NOTES
Brent Huertas is a 44 year old male.   Chief Complaint   Patient presents with    Mouth/Lip Problem     Rm 16 b- burning on tongue hurting.     HPI:    Patient here today for evaluation of mouth sores/burning for last 1 month. Patient seen at a walk in clinic outside of [place] system and given lidocaine and magic mouthwash which he reports has not helped. B12 last checked in November which was 620. He reports symptoms started after he returned from [place] which he was consuming spicy/acidic foods.   Allergies:  Allergies[1]   Current Meds:  Current Outpatient Medications   Medication Sig Dispense Refill    hydrOXYzine 25 MG Oral Tab Take 1 tablet (25 mg total) by mouth 2 (two) times daily as needed.      atorvastatin 20 MG Oral Tab Take 1 tablet (20 mg total) by mouth nightly.      montelukast 10 MG Oral Tab Take 1 tablet (10 mg total) by mouth nightly.      sertraline 100 MG Oral Tab Take 1 tablet (100 mg total) by mouth daily.      Lidocaine Viscous HCl 2 % Mouth/Throat Solution SWISH AND SPIT 10 ML BY MOUTH EVERY 4 TO 6 HOURS AS NEEDED (Patient not taking: Reported on 2/26/2025)          PMH:     Past Medical History:    Anxiety    Hyperlipidemia       ROS:   Review of Systems   Constitutional: Negative.    HENT:  Positive for mouth sores.    Respiratory: Negative.     Neurological: Negative.             PHYSICAL EXAM:    /74   Pulse 78   Resp 18   Ht 5' 11\" (1.803 m)   Wt 204 lb 9.6 oz (92.8 kg)   BMI 28.54 kg/m²   Physical Exam  Constitutional:       Appearance: Normal appearance.   Pulmonary:      Effort: Pulmonary effort is normal.   Skin:     General: Skin is warm and dry.   Neurological:      Mental Status: He is alert.        ASSESSMENT/ PLAN:   1. Tongue sore  Triamcinolone paste sent to use once nightly for 5-7 nights. Avoid acidic/spicy foods. Call next week with update.     2. Vitamin D deficiency  Order active in system. Check value to determine further recommendations.       Health  Maintenance Due   Topic Date Due    DTaP,Tdap,and Td Vaccines (1 - Tdap) Never done    COVID-19 Vaccine (1 - 2024-25 season) Never done    Annual Depression Screening  01/01/2025           Pt indicates understanding and agrees to the plan.     No follow-ups on file.    Jil Mock, APRN          [1] No Known Allergies

## 2025-06-12 RX ORDER — SERTRALINE HYDROCHLORIDE 100 MG/1
100 TABLET, FILM COATED ORAL DAILY
Qty: 90 TABLET | Refills: 3 | Status: SHIPPED | OUTPATIENT
Start: 2025-06-12

## 2025-06-12 RX ORDER — HYDROXYZINE HYDROCHLORIDE 25 MG/1
25 TABLET, FILM COATED ORAL 2 TIMES DAILY PRN
Qty: 180 TABLET | Refills: 1 | Status: SHIPPED | OUTPATIENT
Start: 2025-06-12

## 2025-06-12 RX ORDER — MONTELUKAST SODIUM 10 MG/1
10 TABLET ORAL NIGHTLY
Qty: 90 TABLET | Refills: 3 | Status: SHIPPED | OUTPATIENT
Start: 2025-06-12

## 2025-06-12 NOTE — TELEPHONE ENCOUNTER
Please review: medication fails/has no protocol attached.    No future appointments with primary care medicine   Last office visit: 2/26/25 (YURI Mock)    Patient established care 10/28/24 --- prescriptions last ordered by patient's previous primary care provider.    Patient reported medications